# Patient Record
Sex: MALE | Race: OTHER | HISPANIC OR LATINO | URBAN - METROPOLITAN AREA
[De-identification: names, ages, dates, MRNs, and addresses within clinical notes are randomized per-mention and may not be internally consistent; named-entity substitution may affect disease eponyms.]

---

## 2024-11-22 ENCOUNTER — EMERGENCY (EMERGENCY)
Facility: HOSPITAL | Age: 49
LOS: 1 days | Discharge: HOME | End: 2024-11-22
Attending: STUDENT IN AN ORGANIZED HEALTH CARE EDUCATION/TRAINING PROGRAM
Payer: MEDICAID

## 2024-11-22 VITALS
HEART RATE: 69 BPM | HEIGHT: 67 IN | OXYGEN SATURATION: 100 % | SYSTOLIC BLOOD PRESSURE: 150 MMHG | TEMPERATURE: 98 F | RESPIRATION RATE: 18 BRPM | WEIGHT: 175.05 LBS | DIASTOLIC BLOOD PRESSURE: 91 MMHG

## 2024-11-22 PROCEDURE — 99283 EMERGENCY DEPT VISIT LOW MDM: CPT

## 2024-11-22 RX ORDER — KETOTIFEN FUMARATE 0.25 MG/ML
1 SOLUTION OPHTHALMIC ONCE
Refills: 0 | Status: COMPLETED | OUTPATIENT
Start: 2024-11-22 | End: 2024-11-22

## 2024-11-22 RX ADMIN — KETOTIFEN FUMARATE 1 DROP(S): 0.25 SOLUTION OPHTHALMIC at 18:21

## 2024-11-22 NOTE — ED PROVIDER NOTE - OBJECTIVE STATEMENT
[facility]
49-year-old male with no pertinent past medical history presents with bilateral eye discomfort x 1 month.  Patient reports bilateral eye irritation, itching, clear discharge of the past month, worse in the mornings.  Patient also states he noted swelling to right upper eyelid over the past month.  Denies any eye pain, vision loss, photophobia, pain with eye movements, headache, fevers, numbness, focal weakness, rash.  Denies any eye injury or trauma.  Patient states he occasionally uses glasses, denies any contact lens use.  Denies additional complaints.

## 2024-11-22 NOTE — ED PROVIDER NOTE - NSFOLLOWUPINSTRUCTIONS_ED_ALL_ED_FT
Use warm compresses as discussed.     Use ketotifen eye drop twice a day for 7 days.     Follow up with ophthalmology as discussed (info provided).    Return with any new or worsening symptoms or concerns (see below).  ________________  Stye       A stye, also known as a hordeolum, is a bump that forms on an eyelid. It may look like a pimple next to the eyelash. A stye can form inside the eyelid (internal stye) or outside the eyelid (external stye). A stye can cause redness, swelling, and pain on the eyelid.    Styes are very common. Anyone can get them at any age. They usually occur in just one eye, but you may have more than one in either eye.    What are the causes?  A stye is caused by an infection. The infection is almost always caused by bacteria called Staphylococcus aureus. This is a common type of bacteria that lives on the skin.    An internal stye may result from an infected oil-producing gland inside the eyelid. An external stye may be caused by an infection at the base of the eyelash (hair follicle).    What increases the risk?  You are more likely to develop a stye if:    You have had a stye before.  You have any of these conditions:    Diabetes.  Red, itchy, inflamed eyelids (blepharitis).  A skin condition such as seborrheic dermatitis or rosacea.  High fat levels in your blood (lipids).    What are the signs or symptoms?  The most common symptom of a stye is eyelid pain. Internal styes are more painful than external styes. Other symptoms may include:    Painful swelling of your eyelid.  A scratchy feeling in your eye.  Tearing and redness of your eye.  Pus draining from the stye.    How is this diagnosed?  Your health care provider may be able to diagnose a stye just by examining your eye. The health care provider may also check to make sure:    You do not have a fever or other signs of a more serious infection.  The infection has not spread to other parts of your eye or areas around your eye.    How is this treated?  Most styes will clear up in a few days without treatment or with warm compresses applied to the area. You may need to use antibiotic drops or ointment to treat an infection.    In some cases, if your stye does not heal with routine treatment, your health care provider may drain pus from the stye using a thin blade or needle. This may be done if the stye is large, causing a lot of pain, or affecting your vision.    Follow these instructions at home:  Take over-the-counter and prescription medicines only as told by your health care provider. This includes eye drops or ointments.  If you were prescribed an antibiotic medicine, apply or use it as told by your health care provider. Do not stop using the antibiotic even if your condition improves.  Apply a warm, wet cloth (warm compress) to your eye for 5–10 minutes, 4 times a day.  Clean the affected eyelid as directed by your health care provider.  Do not wear contact lenses or eye makeup until your stye has healed.  Do not try to pop or drain the stye.   Do not rub your eye.    Contact a health care provider if:  You have chills or a fever.  Your stye does not go away after several days.  Your stye affects your vision.  Your eyeball becomes swollen, red, or painful.    Get help right away if:  You have pain when moving your eye around.    Summary  A stye is a bump that forms on an eyelid. It may look like a pimple next to the eyelash.  A stye can form inside the eyelid (internal stye) or outside the eyelid (external stye). A stye can cause redness, swelling, and pain on the eyelid.  Your health care provider may be able to diagnose a stye just by examining your eye.  Apply a warm, wet cloth (warm compress) to your eye for 5–10 minutes, 4 times a day.

## 2024-11-22 NOTE — ED PROVIDER NOTE - PHYSICAL EXAMINATION
CONSTITUTIONAL: non-toxic, well appearing  SKIN: no rash, no petechiae.  EYES: pink conjunctiva, anicteric. Bilateral conjunctival injection, no discharge appreciated. Right upper hordeolum internum noted, PERRL, EOMI, no pain with eye movement, no photophobia. VA: OD 20/20 OS 20/20 OU 20/20 uncorrected  NECK: Supple; FROM  CARD: extremities warm, dry, well perfused  RESP: no respiratory distress  ABD: non-tender  EXT: No edema.   NEURO: Alert, oriented.  PSYCH: Cooperative, appropriate.

## 2024-11-22 NOTE — ED PROVIDER NOTE - NSFOLLOWUPCLINICS_GEN_ALL_ED_FT
[facility]  Ophthalmology  [address] [place] [address]  Phone: [phone]  Fax:     [facility]  Ophthalmology  [address], [place] [address]  Phone: [phone]  Fax:     [facility] - Ophthalmology  Ophthalmology  [address], [address]  [place] [address]  Phone: [phone]  Fax:

## 2024-11-22 NOTE — ED PROVIDER NOTE - NSCAREINITIATED _GEN_ER
Ongoing SW/CM Assessment/Plan of Care Note     See SW/CM flowsheets for goals and other objective data.    Patient/Family discharge goal (s):  Goal #1: Communication facilitated  Goal #2: Extended Care Facility discharge arranged       PT Recommendation:  Recommendation for Discharge Support: PT WI: 24 Hour assist       OT Recommendation:  Recommendation for Discharge Support: OT WI: 24 Hour assist       SLP Recommendation:       Disposition:  Planned Discharge Destination: Rehabilitation/Skilled Care    Progress note:     08:04 Spoke with Mickie she provided 2 more  [facility]. Will call to make referral.     Called and LVM for [facility] admission coordinator  to see if they are taking referrals, and need the fax number.     Called and LVM for [facility] to see if they have any beds available.      13:22 Received a call from Sobia from [facility] they are unable to accept patient their facility is full.    13:26 Called and Left another message for [facility]    13:39 LVM to see if [facility] would reconsider patient.       13:31 Spoke with [facility] she states to faxed updated clinicals. She states that they only have a semi private room available.  Faxed updated clinicals.      14:12   Called [facility] spoke with Patt  they have no available beds.    Patient will not be getting Chemo until after Rehab.      Mickie (Pt's daughter - Mickie, Ph# [phone]) is   agreeable to referrals to the following:       [facility]- Left 2 VM on 8/23  Phone [phone]         [facility]- [facility] 8/22 no bed avail until next week  Phone [phone]  Fax [phone]       [facility]/  Updated clinicals faxed  8/23- Reviewing   Ph# [phone]  Fax# [phone]        [facility]  P [phone]  F [phone]    [place]-[facility] 8/19/2022  Ph# [phone] 8/19              Declined:  [facility]/ [facility] - 8/17 faxed referral Declined Due to No skilled need  Ph# [phone]  Fax [phone]     [facility] - [place] - declined/no beds  Ph# [phone]     [facility]-They have a four year wait list     [facility] [phone]-no beds      [facility]/ [place]--LVM 8/17; 8/18; 8/19 referral faxed- 8/22 Declined due to possible Chemotherapy - LVM to let them know patient is not having chemotherapy now  Ph# [phone]/ [phone] [facility]   [phone]        [facility]--  No beds available    Phone [phone]  F [phone]         [facility] 8/22 no beds available in the near future   Phone [phone]         [facility]/ [facility]--LVM 8/17, 8/18. 8/19,8/22, 8/23 -no beds available   Ph# [phone]            Brenton Pearce(Attending)

## 2024-11-22 NOTE — ED ADULT NURSE NOTE - NSFALLUNIVINTERV_ED_ALL_ED
Bed/Stretcher in lowest position, wheels locked, appropriate side rails in place/Call bell, personal items and telephone in reach/Instruct patient to call for assistance before getting out of bed/chair/stretcher/Non-slip footwear applied when patient is off stretcher/[place] to call system/Physically safe environment - no spills, clutter or unnecessary equipment/Purposeful proactive rounding/Room/bathroom lighting operational, light cord in reach

## 2024-11-22 NOTE — ED PROVIDER NOTE - ED STEMI HIDDEN
rivaroxaban ANTICOAGULANT (XARELTO ANTICOAGULANT) 20 MG TABS tablet        pravastatin (PRAVACHOL) 10 MG tablet          Pt is requesting  A refill(s)               Thank you             Pia Gavin [facility]    
hide

## 2024-11-22 NOTE — ED PROVIDER NOTE - PATIENT PORTAL LINK FT
You can access the FollowMyHealth Patient Portal offered by [facility] by registering at the following website: [email or web address]. By joining [facility]’s FollowMyHealth portal, you will also be able to view your health information using other applications (apps) compatible with our system.

## 2024-11-22 NOTE — ED PROVIDER NOTE - CLINICAL SUMMARY MEDICAL DECISION MAKING FREE TEXT BOX
Eleno: 49-year-old male with no pertinent past medical history presents with bilateral eye discomfort x 1 month.  Patient reports bilateral eye irritation, itching, clear discharge of the past month, worse in the mornings.  Patient also states he noted swelling to right upper eyelid over the past month.  Denies any eye pain, vision loss, photophobia, pain with eye movements, headache, fevers, numbness, focal weakness, rash.  Denies any eye injury or trauma.  Patient states he occasionally uses glasses, denies any contact lens use.  Physical exam per above. Patient with hordeolum internum to right upper lid, also signs of conjunctivitis, ?allergic in setting of eye itching and clear discharge, VA intact. Will provide supportive treatment, discussed warm compresses, ophthalmology follow up, return precautions, pt understood and agreeable with plan.

## 2025-02-03 ENCOUNTER — EMERGENCY (EMERGENCY)
Facility: HOSPITAL | Age: 50
LOS: 1 days | Discharge: LEFT BEFORE DISCHARGE | End: 2025-02-03
Payer: SELF-PAY

## 2025-02-03 PROCEDURE — L9992: CPT
